# Patient Record
Sex: FEMALE | Race: WHITE
[De-identification: names, ages, dates, MRNs, and addresses within clinical notes are randomized per-mention and may not be internally consistent; named-entity substitution may affect disease eponyms.]

---

## 2021-04-16 ENCOUNTER — HOSPITAL ENCOUNTER (INPATIENT)
Dept: HOSPITAL 54 - ER | Age: 66
LOS: 2 days | Discharge: NURSING, REHAB OR LONG-TERM CARE | DRG: 699 | End: 2021-04-18
Attending: NURSE PRACTITIONER | Admitting: NURSE PRACTITIONER
Payer: MEDICARE

## 2021-04-16 VITALS — DIASTOLIC BLOOD PRESSURE: 65 MMHG | SYSTOLIC BLOOD PRESSURE: 148 MMHG

## 2021-04-16 VITALS — HEIGHT: 64 IN | BODY MASS INDEX: 31.07 KG/M2 | WEIGHT: 182 LBS

## 2021-04-16 DIAGNOSIS — E78.5: ICD-10-CM

## 2021-04-16 DIAGNOSIS — I16.0: ICD-10-CM

## 2021-04-16 DIAGNOSIS — I70.1: Primary | ICD-10-CM

## 2021-04-16 DIAGNOSIS — R62.50: ICD-10-CM

## 2021-04-16 DIAGNOSIS — G40.909: ICD-10-CM

## 2021-04-16 DIAGNOSIS — F03.90: ICD-10-CM

## 2021-04-16 DIAGNOSIS — Z20.822: ICD-10-CM

## 2021-04-16 DIAGNOSIS — F41.9: ICD-10-CM

## 2021-04-16 DIAGNOSIS — I70.0: ICD-10-CM

## 2021-04-16 DIAGNOSIS — Z79.899: ICD-10-CM

## 2021-04-16 DIAGNOSIS — R91.8: ICD-10-CM

## 2021-04-16 DIAGNOSIS — R18.8: ICD-10-CM

## 2021-04-16 DIAGNOSIS — F20.9: ICD-10-CM

## 2021-04-16 DIAGNOSIS — I10: ICD-10-CM

## 2021-04-16 DIAGNOSIS — E87.1: ICD-10-CM

## 2021-04-16 LAB
ALBUMIN SERPL BCP-MCNC: 3.6 G/DL (ref 3.4–5)
ALP SERPL-CCNC: 83 U/L (ref 46–116)
ALT SERPL W P-5'-P-CCNC: 17 U/L (ref 12–78)
AST SERPL W P-5'-P-CCNC: 18 U/L (ref 15–37)
BASOPHILS # BLD AUTO: 0.1 /CMM (ref 0–0.2)
BASOPHILS NFR BLD AUTO: 0.6 % (ref 0–2)
BILIRUB DIRECT SERPL-MCNC: 0.1 MG/DL (ref 0–0.2)
BILIRUB SERPL-MCNC: 0.2 MG/DL (ref 0.2–1)
BUN SERPL-MCNC: 11 MG/DL (ref 7–18)
CALCIUM SERPL-MCNC: 8.4 MG/DL (ref 8.5–10.1)
CHLORIDE SERPL-SCNC: 92 MMOL/L (ref 98–107)
CO2 SERPL-SCNC: 28 MMOL/L (ref 21–32)
CREAT SERPL-MCNC: 0.9 MG/DL (ref 0.6–1.3)
EOSINOPHIL NFR BLD AUTO: 19.9 % (ref 0–6)
GLUCOSE SERPL-MCNC: 105 MG/DL (ref 74–106)
HCT VFR BLD AUTO: 39 % (ref 33–45)
HGB BLD-MCNC: 13.3 G/DL (ref 11.5–14.8)
LYMPHOCYTES NFR BLD AUTO: 2.2 /CMM (ref 0.8–4.8)
LYMPHOCYTES NFR BLD AUTO: 26.1 % (ref 20–44)
MCHC RBC AUTO-ENTMCNC: 34 G/DL (ref 31–36)
MCV RBC AUTO: 97 FL (ref 82–100)
MONOCYTES NFR BLD AUTO: 0.7 /CMM (ref 0.1–1.3)
MONOCYTES NFR BLD AUTO: 8.3 % (ref 2–12)
NEUTROPHILS # BLD AUTO: 3.9 /CMM (ref 1.8–8.9)
NEUTROPHILS NFR BLD AUTO: 45.1 % (ref 43–81)
NT-PROBNP SERPL-MCNC: 43 PG/ML (ref 0–125)
PLATELET # BLD AUTO: 207 /CMM (ref 150–450)
POTASSIUM SERPL-SCNC: 3.7 MMOL/L (ref 3.5–5.1)
PROT SERPL-MCNC: 6.9 G/DL (ref 6.4–8.2)
RBC # BLD AUTO: 4.04 MIL/UL (ref 4–5.2)
SODIUM SERPL-SCNC: 129 MMOL/L (ref 136–145)
WBC NRBC COR # BLD AUTO: 8.6 K/UL (ref 4.3–11)

## 2021-04-16 PROCEDURE — G0378 HOSPITAL OBSERVATION PER HR: HCPCS

## 2021-04-16 PROCEDURE — U0003 INFECTIOUS AGENT DETECTION BY NUCLEIC ACID (DNA OR RNA); SEVERE ACUTE RESPIRATORY SYNDROME CORONAVIRUS 2 (SARS-COV-2) (CORONAVIRUS DISEASE [COVID-19]), AMPLIFIED PROBE TECHNIQUE, MAKING USE OF HIGH THROUGHPUT TECHNOLOGIES AS DESCRIBED BY CMS-2020-01-R: HCPCS

## 2021-04-16 RX ADMIN — DIVALPROEX SODIUM SCH MG: 500 TABLET, DELAYED RELEASE ORAL at 17:22

## 2021-04-16 RX ADMIN — TRIAMCINOLONE ACETONIDE SCH GM: 1 OINTMENT TOPICAL at 17:00

## 2021-04-16 RX ADMIN — ACETAMINOPHEN PRN MG: 325 TABLET ORAL at 20:47

## 2021-04-16 RX ADMIN — SODIUM CHLORIDE PRN MLS/HR: 9 INJECTION, SOLUTION INTRAVENOUS at 17:23

## 2021-04-16 RX ADMIN — ATORVASTATIN CALCIUM SCH MG: 10 TABLET, FILM COATED ORAL at 22:39

## 2021-04-16 RX ADMIN — CALCIUM SCH MG: 500 TABLET ORAL at 17:22

## 2021-04-16 RX ADMIN — HYDRALAZINE HYDROCHLORIDE ONE MG: 20 INJECTION INTRAMUSCULAR; INTRAVENOUS at 16:05

## 2021-04-16 RX ADMIN — HYDRALAZINE HYDROCHLORIDE ONE MG: 20 INJECTION INTRAMUSCULAR; INTRAVENOUS at 14:39

## 2021-04-16 RX ADMIN — TRAZODONE HYDROCHLORIDE SCH MG: 50 TABLET ORAL at 22:40

## 2021-04-16 NOTE — NUR
RN ADMITTING NOTES

Received PATIENT FROM ER, VIA GURNEY,patient ambulatory, A/Ox3, forgetful, on room air, SPO2 
96%, no SOB noted, no pain reported, IV line on L AC intact, flushed, patent, starred NS @ 
75cc/hr,  safety measures in place, call light in reach, will cont to monitor

## 2021-04-16 NOTE — NUR
HOLD HYDRALAZINE PER DR. GOODSON DUE TO BP STABLE. PT DENIES CP, SOB, DIZZINESS, 
N/V, HA AT THIS TIME. WILL CONT TO MONITOR.

## 2021-04-16 NOTE — NUR
RN NOTES



PT USES RESTROOM, HAS STEADY GAIT. STANDBY ASSIST. EDUCATED PT ON IMPORTANCE OF USING CALL 
LIGHT FOR ASSISTANCE.

## 2021-04-16 NOTE — NUR
carla, from [facility] and care, c/o headache and high blood pressure. bp 201/108

PT AAOX4, VSS. RR EVEN & UNLABORED. DENIES CP, SOB, DIZZINESS, N/V AT THIS 
TIME. PT SEEN & EVAL'D BY DR. GOODSON. PLACED ON CARDIAC MONITOR, SR. WILL CONT 
TO MONITOR.

## 2021-04-16 NOTE — NUR
RN OPENING NOTES



REC/D PT IN BED, AWAKE. A/O X3. PT NEEDS CONSISTENT REORIENTATION, FORGETFUL. PT IS ON ROOM 
AIR, TOLERATING WELL. NO S/S OF SOB OR RESP DISTRESS NOTED AT THIS TIME. PT IS NORMAL SINUS 
ON TELE MONITOR ING WITH HEART RATE OF 96. PT HAS IV ON LEFT AC, FLUSHED WITH IVF NS RUNNING 
AT 75ML/HR AS ORDERED. AMBULATORY WITH ASSIST, PT AT THIS TIME DENIES PAIN. SAFETY MEASURES 
IN PLACE HOB ELEVATED AS TOLERATED. SIDE RAILS UP X2 BED LOCKED IN LOWEST POSITION WITH CALL 
LIGHT WITHIN REACH BED ALARM ON. WILL CONT TO MONITOR CLOSELY MAKING FREQUENT ROUNDS.

## 2021-04-17 VITALS — DIASTOLIC BLOOD PRESSURE: 82 MMHG | SYSTOLIC BLOOD PRESSURE: 159 MMHG

## 2021-04-17 VITALS — SYSTOLIC BLOOD PRESSURE: 155 MMHG | DIASTOLIC BLOOD PRESSURE: 81 MMHG

## 2021-04-17 VITALS — SYSTOLIC BLOOD PRESSURE: 158 MMHG | DIASTOLIC BLOOD PRESSURE: 102 MMHG

## 2021-04-17 VITALS — SYSTOLIC BLOOD PRESSURE: 129 MMHG | DIASTOLIC BLOOD PRESSURE: 85 MMHG

## 2021-04-17 VITALS — DIASTOLIC BLOOD PRESSURE: 61 MMHG | SYSTOLIC BLOOD PRESSURE: 112 MMHG

## 2021-04-17 VITALS — DIASTOLIC BLOOD PRESSURE: 60 MMHG | SYSTOLIC BLOOD PRESSURE: 102 MMHG

## 2021-04-17 VITALS — SYSTOLIC BLOOD PRESSURE: 179 MMHG | DIASTOLIC BLOOD PRESSURE: 114 MMHG

## 2021-04-17 VITALS — DIASTOLIC BLOOD PRESSURE: 68 MMHG | SYSTOLIC BLOOD PRESSURE: 141 MMHG

## 2021-04-17 LAB
BASOPHILS # BLD AUTO: 0.1 /CMM (ref 0–0.2)
BASOPHILS NFR BLD AUTO: 0.7 % (ref 0–2)
BUN SERPL-MCNC: 13 MG/DL (ref 7–18)
CALCIUM SERPL-MCNC: 9 MG/DL (ref 8.5–10.1)
CHLORIDE SERPL-SCNC: 99 MMOL/L (ref 98–107)
CHOLEST SERPL-MCNC: 165 MG/DL (ref ?–200)
CO2 SERPL-SCNC: 25 MMOL/L (ref 21–32)
CREAT SERPL-MCNC: 0.6 MG/DL (ref 0.6–1.3)
EOSINOPHIL NFR BLD AUTO: 16.9 % (ref 0–6)
GLUCOSE SERPL-MCNC: 97 MG/DL (ref 74–106)
HCT VFR BLD AUTO: 40 % (ref 33–45)
HDLC SERPL-MCNC: 85 MG/DL (ref 40–60)
HGB BLD-MCNC: 13.3 G/DL (ref 11.5–14.8)
LDLC SERPL DIRECT ASSAY-MCNC: 69 MG/DL (ref 0–99)
LYMPHOCYTES NFR BLD AUTO: 1.9 /CMM (ref 0.8–4.8)
LYMPHOCYTES NFR BLD AUTO: 21.3 % (ref 20–44)
MAGNESIUM SERPL-MCNC: 2.3 MG/DL (ref 1.8–2.4)
MCHC RBC AUTO-ENTMCNC: 34 G/DL (ref 31–36)
MCV RBC AUTO: 97 FL (ref 82–100)
MONOCYTES NFR BLD AUTO: 0.8 /CMM (ref 0.1–1.3)
MONOCYTES NFR BLD AUTO: 9.1 % (ref 2–12)
NEUTROPHILS # BLD AUTO: 4.6 /CMM (ref 1.8–8.9)
NEUTROPHILS NFR BLD AUTO: 52 % (ref 43–81)
PHOSPHATE SERPL-MCNC: 3.7 MG/DL (ref 2.5–4.9)
PLATELET # BLD AUTO: 215 /CMM (ref 150–450)
POTASSIUM SERPL-SCNC: 4 MMOL/L (ref 3.5–5.1)
RBC # BLD AUTO: 4.09 MIL/UL (ref 4–5.2)
SODIUM SERPL-SCNC: 133 MMOL/L (ref 136–145)
TRIGL SERPL-MCNC: 38 MG/DL (ref 30–150)
TSH SERPL DL<=0.005 MIU/L-ACNC: 2.74 UIU/ML (ref 0.36–3.74)
WBC NRBC COR # BLD AUTO: 8.9 K/UL (ref 4.3–11)

## 2021-04-17 RX ADMIN — LISINOPRIL SCH MG: 20 TABLET ORAL at 09:12

## 2021-04-17 RX ADMIN — TRIAMCINOLONE ACETONIDE SCH GM: 1 OINTMENT TOPICAL at 17:00

## 2021-04-17 RX ADMIN — TRIAMCINOLONE ACETONIDE SCH GM: 1 OINTMENT TOPICAL at 09:31

## 2021-04-17 RX ADMIN — NITROGLYCERIN SCH GM: 20 OINTMENT TOPICAL at 09:32

## 2021-04-17 RX ADMIN — HYDRALAZINE HYDROCHLORIDE PRN MG: 20 INJECTION INTRAMUSCULAR; INTRAVENOUS at 14:21

## 2021-04-17 RX ADMIN — SODIUM CHLORIDE PRN MLS/HR: 9 INJECTION, SOLUTION INTRAVENOUS at 06:36

## 2021-04-17 RX ADMIN — AMLODIPINE BESYLATE SCH MG: 5 TABLET ORAL at 09:11

## 2021-04-17 RX ADMIN — DIVALPROEX SODIUM SCH MG: 500 TABLET, DELAYED RELEASE ORAL at 17:34

## 2021-04-17 RX ADMIN — TRAZODONE HYDROCHLORIDE SCH MG: 50 TABLET ORAL at 21:07

## 2021-04-17 RX ADMIN — HYDRALAZINE HYDROCHLORIDE PRN MG: 20 INJECTION INTRAMUSCULAR; INTRAVENOUS at 08:05

## 2021-04-17 RX ADMIN — CALCIUM SCH MG: 500 TABLET ORAL at 09:10

## 2021-04-17 RX ADMIN — Medication SCH MCG: at 09:12

## 2021-04-17 RX ADMIN — CALCIUM SCH MG: 500 TABLET ORAL at 17:33

## 2021-04-17 RX ADMIN — NITROGLYCERIN SCH GM: 20 OINTMENT TOPICAL at 21:07

## 2021-04-17 RX ADMIN — PAROXETINE HYDROCHLORIDE SCH MG: 10 TABLET, FILM COATED ORAL at 09:10

## 2021-04-17 RX ADMIN — DIVALPROEX SODIUM SCH MG: 500 TABLET, DELAYED RELEASE ORAL at 09:10

## 2021-04-17 RX ADMIN — PANTOPRAZOLE SODIUM SCH MG: 40 TABLET, DELAYED RELEASE ORAL at 09:09

## 2021-04-17 RX ADMIN — ACETAMINOPHEN PRN MG: 325 TABLET ORAL at 09:42

## 2021-04-17 RX ADMIN — VITAMIN D, TAB 1000IU (100/BT) SCH UNIT: 25 TAB at 09:10

## 2021-04-17 RX ADMIN — ATORVASTATIN CALCIUM SCH MG: 10 TABLET, FILM COATED ORAL at 21:07

## 2021-04-17 NOTE — NUR
RN Note:



Pt received alert awake oriented X 2 with periods of confusion & forgetfulness. On RA, no 
breathing distress noted. Denies pain & discomfort. Safety measures observed, IV line 
intact, running with IV fluids as ordered. Encourage to use call light for assistance. 
Continue to monitor.

## 2021-04-17 NOTE — NUR
RN CLOSING NOTES



NO SIGNIFICANT CHANGES OVER NIGHT. PT STILL ON ROOM AIR, TOLERATING WELL, NO S/S OF  SOB OR 
RESP DISTRESS AT THIS TIME. PT DENIES PAIN. ON TELE IS STILL SINUS RHYTHM WITH HEART RATE IN 
THE 80S. PT REMAINS WITH RIGHT HAND #22 WITH NS @ 75CC/HR RUNNING AS ORDERED. NO S/S OF 
INFILTRATION NOTED. BED BATH DONE, ALL NEEDS ATTENDED AND SAFETY MEASURES IN PLACE. HOB 
ELEVATED. SIDE RAILS UP X2, BED LOCKED IN LOWEST POSITION WITH BED ALARM ON. CALL LIGHT 
WITHIN REACH. WILL ENDORSE TO AM SHIFT FOR CONTINUATION OF CARE.

## 2021-04-17 NOTE — NUR
RN NOTES



PT APPEARS TO BE SLEEPING UPON ROUNDS. NO DISTRESS NOTED AT THIS TIME. WILL CONT TO MONITOR 
AND ASSESS FOR CHANGE OF CONDITION

## 2021-04-18 VITALS — SYSTOLIC BLOOD PRESSURE: 134 MMHG | DIASTOLIC BLOOD PRESSURE: 86 MMHG

## 2021-04-18 VITALS — SYSTOLIC BLOOD PRESSURE: 106 MMHG | DIASTOLIC BLOOD PRESSURE: 66 MMHG

## 2021-04-18 VITALS — DIASTOLIC BLOOD PRESSURE: 95 MMHG | SYSTOLIC BLOOD PRESSURE: 155 MMHG

## 2021-04-18 LAB
BASOPHILS # BLD AUTO: 0.1 /CMM (ref 0–0.2)
BASOPHILS NFR BLD AUTO: 0.5 % (ref 0–2)
BUN SERPL-MCNC: 17 MG/DL (ref 7–18)
CALCIUM SERPL-MCNC: 9 MG/DL (ref 8.5–10.1)
CHLORIDE SERPL-SCNC: 93 MMOL/L (ref 98–107)
CO2 SERPL-SCNC: 25 MMOL/L (ref 21–32)
CREAT SERPL-MCNC: 0.7 MG/DL (ref 0.6–1.3)
EOSINOPHIL NFR BLD AUTO: 11.2 % (ref 0–6)
GLUCOSE SERPL-MCNC: 96 MG/DL (ref 74–106)
HCT VFR BLD AUTO: 37 % (ref 33–45)
HGB BLD-MCNC: 12.6 G/DL (ref 11.5–14.8)
LYMPHOCYTES NFR BLD AUTO: 1.8 /CMM (ref 0.8–4.8)
LYMPHOCYTES NFR BLD AUTO: 14.8 % (ref 20–44)
MAGNESIUM SERPL-MCNC: 2.5 MG/DL (ref 1.8–2.4)
MCHC RBC AUTO-ENTMCNC: 34 G/DL (ref 31–36)
MCV RBC AUTO: 96 FL (ref 82–100)
MONOCYTES NFR BLD AUTO: 1.2 /CMM (ref 0.1–1.3)
MONOCYTES NFR BLD AUTO: 10 % (ref 2–12)
NEUTROPHILS # BLD AUTO: 7.5 /CMM (ref 1.8–8.9)
NEUTROPHILS NFR BLD AUTO: 63.5 % (ref 43–81)
PHOSPHATE SERPL-MCNC: 3.7 MG/DL (ref 2.5–4.9)
PLATELET # BLD AUTO: 218 /CMM (ref 150–450)
POTASSIUM SERPL-SCNC: 3.8 MMOL/L (ref 3.5–5.1)
RBC # BLD AUTO: 3.88 MIL/UL (ref 4–5.2)
SODIUM SERPL-SCNC: 127 MMOL/L (ref 136–145)
TSH SERPL DL<=0.005 MIU/L-ACNC: 1.8 UIU/ML (ref 0.36–3.74)
URATE SERPL-MCNC: 3.9 MG/DL (ref 2.6–7.2)
WBC NRBC COR # BLD AUTO: 11.8 K/UL (ref 4.3–11)

## 2021-04-18 RX ADMIN — ACETAMINOPHEN PRN MG: 325 TABLET ORAL at 05:34

## 2021-04-18 RX ADMIN — AMLODIPINE BESYLATE SCH MG: 5 TABLET ORAL at 08:05

## 2021-04-18 RX ADMIN — LISINOPRIL SCH MG: 20 TABLET ORAL at 08:05

## 2021-04-18 RX ADMIN — PAROXETINE HYDROCHLORIDE SCH MG: 10 TABLET, FILM COATED ORAL at 08:05

## 2021-04-18 RX ADMIN — TRIAMCINOLONE ACETONIDE SCH GM: 1 OINTMENT TOPICAL at 08:06

## 2021-04-18 RX ADMIN — DIVALPROEX SODIUM SCH MG: 500 TABLET, DELAYED RELEASE ORAL at 08:05

## 2021-04-18 RX ADMIN — PANTOPRAZOLE SODIUM SCH MG: 40 TABLET, DELAYED RELEASE ORAL at 08:04

## 2021-04-18 RX ADMIN — VITAMIN D, TAB 1000IU (100/BT) SCH UNIT: 25 TAB at 08:06

## 2021-04-18 RX ADMIN — Medication SCH MCG: at 08:04

## 2021-04-18 RX ADMIN — NITROGLYCERIN SCH GM: 20 OINTMENT TOPICAL at 08:06

## 2021-04-18 RX ADMIN — CALCIUM SCH MG: 500 TABLET ORAL at 08:05

## 2021-04-18 NOTE — NUR
PATIENT DISCHARGED IN STABLE CONDITION. PAPERWORK, INSTRUCTIONS, BELONGINGS GIVEN. IV ACCESS 
REMOVED. PATIENT DOWNGRADED TO MED SURG, SO NO TELE BOX ANYMORE.

## 2021-04-18 NOTE — NUR
RECEIVED PATIENT IN BED. NO ACUTE DISTRESS NOTED. PATIENT ALERT & ORIENTED X2, WITH 
FORGETFULNESS. PATIENT ON ROOM AIR, SATURATING WELL. PATIENT ON TELEMETRY MONITOR, NSR 
NOTED. PATIENT R HAND IV ACCESS INTACT, PATENT. PATIENT SAFETY MEASURES MAINTAINED. CALL 
LIGHT WITHIN REACH. WILL CONTINUE TO MONITOR

## 2021-04-18 NOTE — NUR
OK BY DR. GREENWOOD ORDERING MD TO CONSENT FOR CT CHEST WITH  CONTRAST, KRISTOPHER DAVIDSON CONSENTED 
RADIOLOGIST MADE AWARE.

## 2021-04-19 NOTE — NUR
note: 

SW contacted patient's Board and Care [phone] to locate a family member or legal 
decision maker. ILANA spoke to the owner of the Board and Care, Avery who stated that the 
patient does not have any family contact. Avery stated the patient is a [facility] 
client and provided this SW with the patient's 's contact information (Zhane Meyers [phone]).



ILANA called patient's [facility]  to discuss the patient (Zhane Meyers 
[phone]) and gather information. ILANA was unable to reach  and left a voicemail.

## 2022-02-03 ENCOUNTER — HOSPITAL ENCOUNTER (INPATIENT)
Dept: HOSPITAL 54 - ER | Age: 67
LOS: 2 days | Discharge: NURSING, REHAB OR LONG-TERM CARE | DRG: 69 | End: 2022-02-05
Attending: NURSE PRACTITIONER | Admitting: NURSE PRACTITIONER
Payer: MEDICARE

## 2022-02-03 VITALS — BODY MASS INDEX: 33.38 KG/M2 | WEIGHT: 188.38 LBS | HEIGHT: 63 IN

## 2022-02-03 VITALS — SYSTOLIC BLOOD PRESSURE: 186 MMHG | DIASTOLIC BLOOD PRESSURE: 99 MMHG

## 2022-02-03 DIAGNOSIS — G45.9: Primary | ICD-10-CM

## 2022-02-03 DIAGNOSIS — Z79.899: ICD-10-CM

## 2022-02-03 DIAGNOSIS — Z79.82: ICD-10-CM

## 2022-02-03 DIAGNOSIS — R13.10: ICD-10-CM

## 2022-02-03 DIAGNOSIS — F02.80: ICD-10-CM

## 2022-02-03 DIAGNOSIS — R73.9: ICD-10-CM

## 2022-02-03 DIAGNOSIS — I10: ICD-10-CM

## 2022-02-03 DIAGNOSIS — R29.701: ICD-10-CM

## 2022-02-03 DIAGNOSIS — G40.909: ICD-10-CM

## 2022-02-03 DIAGNOSIS — E78.5: ICD-10-CM

## 2022-02-03 DIAGNOSIS — F20.9: ICD-10-CM

## 2022-02-03 DIAGNOSIS — E04.1: ICD-10-CM

## 2022-02-03 DIAGNOSIS — F32.A: ICD-10-CM

## 2022-02-03 DIAGNOSIS — G20: ICD-10-CM

## 2022-02-03 DIAGNOSIS — I70.1: ICD-10-CM

## 2022-02-03 LAB
BASOPHILS # BLD AUTO: 0 K/UL (ref 0–0.2)
BASOPHILS NFR BLD AUTO: 0.8 % (ref 0–2)
BUN SERPL-MCNC: 14 MG/DL (ref 7–18)
CALCIUM SERPL-MCNC: 9.3 MG/DL (ref 8.5–10.1)
CHLORIDE SERPL-SCNC: 104 MMOL/L (ref 98–107)
CO2 SERPL-SCNC: 24 MMOL/L (ref 21–32)
CREAT SERPL-MCNC: 0.8 MG/DL (ref 0.6–1.3)
EOSINOPHIL NFR BLD AUTO: 2.9 % (ref 0–6)
GLUCOSE SERPL-MCNC: 178 MG/DL (ref 74–106)
HCT VFR BLD AUTO: 38 % (ref 33–45)
HGB BLD-MCNC: 12.8 G/DL (ref 11.5–14.8)
LYMPHOCYTES NFR BLD AUTO: 1.9 K/UL (ref 0.8–4.8)
LYMPHOCYTES NFR BLD AUTO: 30.8 % (ref 20–44)
MCHC RBC AUTO-ENTMCNC: 34 G/DL (ref 31–36)
MCV RBC AUTO: 99 FL (ref 82–100)
MONOCYTES NFR BLD AUTO: 0.5 K/UL (ref 0.1–1.3)
MONOCYTES NFR BLD AUTO: 8 % (ref 2–12)
NEUTROPHILS # BLD AUTO: 3.5 K/UL (ref 1.8–8.9)
NEUTROPHILS NFR BLD AUTO: 57.5 % (ref 43–81)
PLATELET # BLD AUTO: 249 K/UL (ref 150–450)
POTASSIUM SERPL-SCNC: 3.8 MMOL/L (ref 3.5–5.1)
RBC # BLD AUTO: 3.88 MIL/UL (ref 4–5.2)
SODIUM SERPL-SCNC: 136 MMOL/L (ref 136–145)
WBC NRBC COR # BLD AUTO: 6.2 K/UL (ref 4.3–11)

## 2022-02-03 PROCEDURE — G0378 HOSPITAL OBSERVATION PER HR: HCPCS

## 2022-02-03 PROCEDURE — C9803 HOPD COVID-19 SPEC COLLECT: HCPCS

## 2022-02-03 RX ADMIN — CARVEDILOL SCH MG: 12.5 TABLET, FILM COATED ORAL at 17:54

## 2022-02-03 RX ADMIN — TRAZODONE HYDROCHLORIDE SCH MG: 50 TABLET ORAL at 22:48

## 2022-02-03 RX ADMIN — ATORVASTATIN CALCIUM SCH MG: 40 TABLET, FILM COATED ORAL at 22:48

## 2022-02-03 RX ADMIN — LEVETIRACETAM SCH MG: 250 TABLET, FILM COATED ORAL at 17:53

## 2022-02-03 RX ADMIN — ENOXAPARIN SODIUM SCH MG: 40 INJECTION SUBCUTANEOUS at 17:46

## 2022-02-04 VITALS — SYSTOLIC BLOOD PRESSURE: 131 MMHG | DIASTOLIC BLOOD PRESSURE: 79 MMHG

## 2022-02-04 VITALS — DIASTOLIC BLOOD PRESSURE: 76 MMHG | SYSTOLIC BLOOD PRESSURE: 128 MMHG

## 2022-02-04 VITALS — SYSTOLIC BLOOD PRESSURE: 148 MMHG | DIASTOLIC BLOOD PRESSURE: 76 MMHG

## 2022-02-04 VITALS — SYSTOLIC BLOOD PRESSURE: 155 MMHG | DIASTOLIC BLOOD PRESSURE: 104 MMHG

## 2022-02-04 VITALS — DIASTOLIC BLOOD PRESSURE: 75 MMHG | SYSTOLIC BLOOD PRESSURE: 150 MMHG

## 2022-02-04 LAB
BASOPHILS # BLD AUTO: 0 K/UL (ref 0–0.2)
BASOPHILS NFR BLD AUTO: 0.6 % (ref 0–2)
BUN SERPL-MCNC: 13 MG/DL (ref 7–18)
CALCIUM SERPL-MCNC: 9.1 MG/DL (ref 8.5–10.1)
CHLORIDE SERPL-SCNC: 103 MMOL/L (ref 98–107)
CHOLEST SERPL-MCNC: 178 MG/DL (ref ?–200)
CO2 SERPL-SCNC: 25 MMOL/L (ref 21–32)
CREAT SERPL-MCNC: 0.7 MG/DL (ref 0.6–1.3)
EOSINOPHIL NFR BLD AUTO: 2.1 % (ref 0–6)
GLUCOSE SERPL-MCNC: 106 MG/DL (ref 74–106)
HCT VFR BLD AUTO: 39 % (ref 33–45)
HDLC SERPL-MCNC: 88 MG/DL (ref 40–60)
HGB BLD-MCNC: 12.9 G/DL (ref 11.5–14.8)
LDLC SERPL DIRECT ASSAY-MCNC: 68 MG/DL (ref 0–99)
LYMPHOCYTES NFR BLD AUTO: 2.1 K/UL (ref 0.8–4.8)
LYMPHOCYTES NFR BLD AUTO: 29.2 % (ref 20–44)
MAGNESIUM SERPL-MCNC: 2.2 MG/DL (ref 1.8–2.4)
MCHC RBC AUTO-ENTMCNC: 33 G/DL (ref 31–36)
MCV RBC AUTO: 99 FL (ref 82–100)
MONOCYTES NFR BLD AUTO: 0.8 K/UL (ref 0.1–1.3)
MONOCYTES NFR BLD AUTO: 11.1 % (ref 2–12)
NEUTROPHILS # BLD AUTO: 4.2 K/UL (ref 1.8–8.9)
NEUTROPHILS NFR BLD AUTO: 57 % (ref 43–81)
PLATELET # BLD AUTO: 245 K/UL (ref 150–450)
POTASSIUM SERPL-SCNC: 3.6 MMOL/L (ref 3.5–5.1)
RBC # BLD AUTO: 3.91 MIL/UL (ref 4–5.2)
SODIUM SERPL-SCNC: 136 MMOL/L (ref 136–145)
TRIGL SERPL-MCNC: 91 MG/DL (ref 30–150)
TSH SERPL DL<=0.005 MIU/L-ACNC: 2.73 UIU/ML (ref 0.36–3.74)
WBC NRBC COR # BLD AUTO: 7.3 K/UL (ref 4.3–11)

## 2022-02-04 RX ADMIN — LEVETIRACETAM SCH MG: 250 TABLET, FILM COATED ORAL at 17:37

## 2022-02-04 RX ADMIN — CARVEDILOL SCH MG: 12.5 TABLET, FILM COATED ORAL at 08:17

## 2022-02-04 RX ADMIN — Medication SCH MCG: at 08:15

## 2022-02-04 RX ADMIN — VITAMIN D, TAB 1000IU (100/BT) SCH UNIT: 25 TAB at 08:16

## 2022-02-04 RX ADMIN — ENOXAPARIN SODIUM SCH MG: 40 INJECTION SUBCUTANEOUS at 17:40

## 2022-02-04 RX ADMIN — LEVETIRACETAM SCH MG: 250 TABLET, FILM COATED ORAL at 08:16

## 2022-02-04 RX ADMIN — AMLODIPINE BESYLATE SCH MG: 5 TABLET ORAL at 08:16

## 2022-02-04 RX ADMIN — TRAZODONE HYDROCHLORIDE SCH MG: 50 TABLET ORAL at 21:02

## 2022-02-04 RX ADMIN — CARVEDILOL SCH MG: 12.5 TABLET, FILM COATED ORAL at 17:37

## 2022-02-04 RX ADMIN — ATORVASTATIN CALCIUM SCH MG: 40 TABLET, FILM COATED ORAL at 21:02

## 2022-02-04 RX ADMIN — PAROXETINE HYDROCHLORIDE SCH MG: 10 TABLET, FILM COATED ORAL at 09:22

## 2022-02-05 VITALS — DIASTOLIC BLOOD PRESSURE: 78 MMHG | SYSTOLIC BLOOD PRESSURE: 119 MMHG

## 2022-02-05 VITALS — DIASTOLIC BLOOD PRESSURE: 86 MMHG | SYSTOLIC BLOOD PRESSURE: 170 MMHG

## 2022-02-05 VITALS — DIASTOLIC BLOOD PRESSURE: 87 MMHG | SYSTOLIC BLOOD PRESSURE: 154 MMHG

## 2022-02-05 VITALS — SYSTOLIC BLOOD PRESSURE: 146 MMHG | DIASTOLIC BLOOD PRESSURE: 88 MMHG

## 2022-02-05 VITALS — DIASTOLIC BLOOD PRESSURE: 86 MMHG | SYSTOLIC BLOOD PRESSURE: 148 MMHG

## 2022-02-05 LAB
BASOPHILS # BLD AUTO: 0 K/UL (ref 0–0.2)
BASOPHILS NFR BLD AUTO: 0.6 % (ref 0–2)
BUN SERPL-MCNC: 12 MG/DL (ref 7–18)
CALCIUM SERPL-MCNC: 9.6 MG/DL (ref 8.5–10.1)
CHLORIDE SERPL-SCNC: 101 MMOL/L (ref 98–107)
CO2 SERPL-SCNC: 27 MMOL/L (ref 21–32)
CREAT SERPL-MCNC: 0.7 MG/DL (ref 0.6–1.3)
EOSINOPHIL NFR BLD AUTO: 2.4 % (ref 0–6)
GLUCOSE SERPL-MCNC: 107 MG/DL (ref 74–106)
HCT VFR BLD AUTO: 38 % (ref 33–45)
HGB BLD-MCNC: 12.9 G/DL (ref 11.5–14.8)
LYMPHOCYTES NFR BLD AUTO: 1.8 K/UL (ref 0.8–4.8)
LYMPHOCYTES NFR BLD AUTO: 32 % (ref 20–44)
MAGNESIUM SERPL-MCNC: 2.2 MG/DL (ref 1.8–2.4)
MCHC RBC AUTO-ENTMCNC: 34 G/DL (ref 31–36)
MCV RBC AUTO: 98 FL (ref 82–100)
MONOCYTES NFR BLD AUTO: 0.7 K/UL (ref 0.1–1.3)
MONOCYTES NFR BLD AUTO: 12.3 % (ref 2–12)
NEUTROPHILS # BLD AUTO: 3 K/UL (ref 1.8–8.9)
NEUTROPHILS NFR BLD AUTO: 52.7 % (ref 43–81)
PLATELET # BLD AUTO: 230 K/UL (ref 150–450)
POTASSIUM SERPL-SCNC: 3.7 MMOL/L (ref 3.5–5.1)
RBC # BLD AUTO: 3.86 MIL/UL (ref 4–5.2)
SODIUM SERPL-SCNC: 136 MMOL/L (ref 136–145)
WBC NRBC COR # BLD AUTO: 5.7 K/UL (ref 4.3–11)

## 2022-02-05 RX ADMIN — AMLODIPINE BESYLATE SCH MG: 5 TABLET ORAL at 08:11

## 2022-02-05 RX ADMIN — CARVEDILOL SCH MG: 12.5 TABLET, FILM COATED ORAL at 08:10

## 2022-02-05 RX ADMIN — VITAMIN D, TAB 1000IU (100/BT) SCH UNIT: 25 TAB at 08:10

## 2022-02-05 RX ADMIN — Medication SCH MCG: at 08:10

## 2022-02-05 RX ADMIN — LEVETIRACETAM SCH MG: 250 TABLET, FILM COATED ORAL at 16:24

## 2022-02-05 RX ADMIN — LEVETIRACETAM SCH MG: 250 TABLET, FILM COATED ORAL at 08:10

## 2022-02-05 RX ADMIN — PAROXETINE HYDROCHLORIDE SCH MG: 10 TABLET, FILM COATED ORAL at 08:13

## 2022-02-05 RX ADMIN — CARVEDILOL SCH MG: 12.5 TABLET, FILM COATED ORAL at 16:24

## 2022-05-02 ENCOUNTER — OFFICE (OUTPATIENT)
Dept: URBAN - METROPOLITAN AREA CLINIC 67 | Facility: CLINIC | Age: 67
End: 2022-05-02

## 2022-05-02 VITALS — DIASTOLIC BLOOD PRESSURE: 85 MMHG | WEIGHT: 180 LBS | SYSTOLIC BLOOD PRESSURE: 158 MMHG

## 2022-05-02 DIAGNOSIS — R13.10 DYSPHAGIA: ICD-10-CM

## 2022-05-02 DIAGNOSIS — R93.3 ABNORMAL GI STUDY/IMAGING: ICD-10-CM

## 2022-05-02 DIAGNOSIS — K22.2 ESOPHAGEAL STRICTURE: ICD-10-CM

## 2022-05-02 PROCEDURE — 99204 OFFICE O/P NEW MOD 45 MIN: CPT | Performed by: STUDENT IN AN ORGANIZED HEALTH CARE EDUCATION/TRAINING PROGRAM

## 2022-05-02 NOTE — SERVICENOTES
This note was dictated with Dragon dictation software and any errors or typos are unintentional, PSJ with MAC

## 2022-05-02 NOTE — SERVICEHPINOTES
I had the pleasure of seeing the patient in consultation for dysphagia. The patient is a pleasant 67-year-old female accompanied by her caretaker who resides in a long-term care facility with a past medical history seizures, hypertension, hyperlipidemia, and bipolar disorder. Over the past several months they have been noticing that the patient has been having difficulty after swallowing food with episodes of choking and regurgitation. She is having to be on a puréed diet now. They report an ENT evaluation with inflammation and mucus in the upper airway. An esophagram was performed that demonstrated an esophageal ring, tertiary contractions, and a small hiatal hernia. The patient herself notes that food sometimes gets stuck in her neck. She denies overt evidence of bleeding. She gained weight during Covid and is now losing weight which is unclear if this is intentional or not but she is on a purée diet.

## 2022-07-21 ENCOUNTER — OFFICE (OUTPATIENT)
Dept: URBAN - METROPOLITAN AREA CLINIC 67 | Facility: CLINIC | Age: 67
End: 2022-07-21

## 2022-07-21 VITALS — WEIGHT: 162 LBS | DIASTOLIC BLOOD PRESSURE: 87 MMHG | SYSTOLIC BLOOD PRESSURE: 143 MMHG

## 2022-07-21 DIAGNOSIS — R13.19 ODYNOPHAGIA: ICD-10-CM

## 2022-07-21 DIAGNOSIS — K22.2 ESOPHAGEAL STRICTURE: ICD-10-CM

## 2022-07-21 DIAGNOSIS — R13.10 DYSPHAGIA: ICD-10-CM

## 2022-07-21 PROCEDURE — 99213 OFFICE O/P EST LOW 20 MIN: CPT

## 2022-07-21 RX ORDER — SUCRALFATE ORAL 1 G/10ML
SUSPENSION ORAL
Qty: 200 | Status: ACTIVE

## 2022-07-21 NOTE — SERVICEHPINOTES
I had the pleasure of seeing the patient and her caregiver for folllowup of dysphagia. The patient is a pleasant 67-year-old female accompanied by her caretaker who resides in a long-term care facility with a past medical history seizures, hypertension, hyperlipidemia, and bipolar disorder. She had been noticing several months of difficulty after swallowing food with episodes of choking and regurgitation.  Upper endoscopy demonstrated erosive esophagitis with an esophageal stricture which was dilated.  She is having to be on a puréed diet now. They report an ENT evaluation with inflammation and mucus in the upper airway. An esophagram was performed that demonstrated an esophageal ring, tertiary contractions, and a small hiatal hernia. The patient herself notes that food sometimes gets stuck in her neck. She denies overt evidence of bleeding. She gained weight during Covid and is now losing weight which is unclear if this is intentional or not but she is on a purée diet.

## 2023-01-25 ENCOUNTER — OFFICE (OUTPATIENT)
Dept: URBAN - METROPOLITAN AREA CLINIC 67 | Facility: CLINIC | Age: 68
End: 2023-01-25

## 2023-01-25 VITALS — WEIGHT: 158 LBS | SYSTOLIC BLOOD PRESSURE: 125 MMHG | DIASTOLIC BLOOD PRESSURE: 84 MMHG | HEIGHT: 60 IN

## 2023-01-25 DIAGNOSIS — Z86.010 PERSONAL HISTORY COLON POLYPS: ICD-10-CM

## 2023-01-25 DIAGNOSIS — R56.9 SEIZURE: ICD-10-CM

## 2023-01-25 DIAGNOSIS — K22.2 ESOPHAGEAL STRICTURE: ICD-10-CM

## 2023-01-25 DIAGNOSIS — R13.10 DYSPHAGIA: ICD-10-CM

## 2023-01-25 PROCEDURE — 99214 OFFICE O/P EST MOD 30 MIN: CPT | Performed by: STUDENT IN AN ORGANIZED HEALTH CARE EDUCATION/TRAINING PROGRAM

## 2023-01-25 NOTE — SERVICEHPINOTES
I had the pleasure of seeing the patient and her caregiver for folllowup of dysphagia and a history of colon polyps. The patient is a pleasant 67-year-old female accompanied by her caretaker who resides in a long-term care facility with a past medical history seizures, hypertension, hyperlipidemia, and bipolar disorder. She has a history of GERD with a recurrent lower esophageal stricture requiring dilation.  Last endoscopy was in the middle of last year and while stricture was visible it was very mild and she was maximally dilated at that time.  She did notice an improvement but now returns with some episodes of swallowing difficulty.  She is not having any choking episodes anymore but she subjectively complains of some difficulty with swallowing.  The caretaker did mention that she does have times where she will complain of things for attention seeking.  The patient also had a colonoscopy in 2018 and is due for repeat.

## 2023-04-16 ENCOUNTER — HOSPITAL ENCOUNTER (INPATIENT)
Dept: HOSPITAL 54 - ER | Age: 68
LOS: 4 days | Discharge: HOME HEALTH | DRG: 177 | End: 2023-04-20
Attending: INTERNAL MEDICINE | Admitting: NURSE PRACTITIONER
Payer: MEDICARE

## 2023-04-16 VITALS — SYSTOLIC BLOOD PRESSURE: 157 MMHG | DIASTOLIC BLOOD PRESSURE: 88 MMHG

## 2023-04-16 VITALS — WEIGHT: 143 LBS | HEIGHT: 64 IN | BODY MASS INDEX: 24.41 KG/M2

## 2023-04-16 DIAGNOSIS — F09: ICD-10-CM
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
## 2023-04-16 DIAGNOSIS — Z20.822: ICD-10-CM

## 2023-04-16 DIAGNOSIS — Z79.899: ICD-10-CM

## 2023-04-16 DIAGNOSIS — E66.01: ICD-10-CM

## 2023-04-16 DIAGNOSIS — J96.21: ICD-10-CM

## 2023-04-16 DIAGNOSIS — Z79.82: ICD-10-CM

## 2023-04-16 DIAGNOSIS — F03.90: ICD-10-CM

## 2023-04-16 DIAGNOSIS — D68.59: ICD-10-CM

## 2023-04-16 DIAGNOSIS — J15.6: Primary | ICD-10-CM

## 2023-04-16 DIAGNOSIS — I10: ICD-10-CM

## 2023-04-16 DIAGNOSIS — G40.909: ICD-10-CM

## 2023-04-16 LAB
BASOPHILS # BLD AUTO: 0 K/UL (ref 0–0.2)
BASOPHILS NFR BLD AUTO: 0.1 % (ref 0–2)
BUN SERPL-MCNC: 20 MG/DL (ref 7–18)
CALCIUM SERPL-MCNC: 9.2 MG/DL (ref 8.5–10.1)
CHLORIDE SERPL-SCNC: 101 MMOL/L (ref 98–107)
CO2 SERPL-SCNC: 26 MMOL/L (ref 21–32)
CREAT SERPL-MCNC: 1.1 MG/DL (ref 0.6–1.3)
EOSINOPHIL NFR BLD AUTO: 0.1 % (ref 0–6)
GLUCOSE SERPL-MCNC: 141 MG/DL (ref 74–106)
HCT VFR BLD AUTO: 34 % (ref 33–45)
HGB BLD-MCNC: 10.9 G/DL (ref 11.5–14.8)
LYMPHOCYTES NFR BLD AUTO: 0.6 K/UL (ref 0.8–4.8)
LYMPHOCYTES NFR BLD AUTO: 3.5 % (ref 20–44)
MCHC RBC AUTO-ENTMCNC: 32 G/DL (ref 31–36)
MCV RBC AUTO: 96 FL (ref 82–100)
MONOCYTES NFR BLD AUTO: 0.2 K/UL (ref 0.1–1.3)
MONOCYTES NFR BLD AUTO: 1.5 % (ref 2–12)
NEUTROPHILS # BLD AUTO: 15 K/UL (ref 1.8–8.9)
NEUTROPHILS NFR BLD AUTO: 94.8 % (ref 43–81)
PLATELET # BLD AUTO: 182 K/UL (ref 150–450)
POTASSIUM SERPL-SCNC: 3.7 MMOL/L (ref 3.5–5.1)
RBC # BLD AUTO: 3.59 MIL/UL (ref 4–5.2)
SODIUM SERPL-SCNC: 134 MMOL/L (ref 136–145)
WBC NRBC COR # BLD AUTO: 15.8 K/UL (ref 4.3–11)

## 2023-04-16 PROCEDURE — C9803 HOPD COVID-19 SPEC COLLECT: HCPCS

## 2023-04-16 PROCEDURE — A4223 INFUSION SUPPLIES W/O PUMP: HCPCS

## 2023-04-16 PROCEDURE — G0378 HOSPITAL OBSERVATION PER HR: HCPCS

## 2023-04-16 RX ADMIN — MAGNESIUM SULFATE IN DEXTROSE SCH MLS/HR: 10 INJECTION, SOLUTION INTRAVENOUS at 20:00

## 2023-04-16 RX ADMIN — ATORVASTATIN CALCIUM SCH MG: 10 TABLET, FILM COATED ORAL at 22:50

## 2023-04-16 RX ADMIN — CARVEDILOL SCH MG: 12.5 TABLET, FILM COATED ORAL at 22:50

## 2023-04-16 RX ADMIN — ENOXAPARIN SODIUM SCH MG: 40 INJECTION SUBCUTANEOUS at 22:40

## 2023-04-16 RX ADMIN — TRAZODONE HYDROCHLORIDE SCH MG: 50 TABLET ORAL at 22:46

## 2023-04-16 RX ADMIN — BENZTROPINE MESYLATE SCH MG: 1 TABLET ORAL at 22:50

## 2023-04-16 RX ADMIN — MAGNESIUM SULFATE IN DEXTROSE SCH MLS/HR: 10 INJECTION, SOLUTION INTRAVENOUS at 18:34

## 2023-04-16 RX ADMIN — LEVETIRACETAM SCH MG: 250 TABLET, FILM COATED ORAL at 22:46

## 2023-04-16 RX ADMIN — SODIUM CHLORIDE PRN MLS/HR: 9 INJECTION, SOLUTION INTRAVENOUS at 23:22

## 2023-04-16 NOTE — NUR
[id number] FROM [facility] FOR SOB COUGH CONGESTION X 2 DAYS 89% ROOM AIR 
ON SCENE, 96% ON 4L NC ON ARRIVAL

## 2023-04-16 NOTE — NUR
TELE RN ADMITTING NOTES



RECEIVED PATIENT VIA EventyardETCHER. AWAKE. A/O X 2-3. ABLE TO MAKE NEEDS KNOWN. EPISODES OF 
CONFUSION AND INCORRECT ANSWERS. PATIENT ON NASAL CANNULA @3LPM TOLERATING WELL WITH 
SATURATION OF 96%. PATIENT HAVING SOB AND WHEEZING UPON EXERTION. PATIENT DENIES ANY PAIN AT 
THE MOMENT. PATIENT ON TELE MONITOR WITH READING OF SINUS RHYTHM 70BPM. SKIN ASSESSMENT AND 
BODY CHECK DONE WITH PATIENTS PERMISSION TO DO SO. SKIN IS INTACT NOTED. IV ACCESS ON RIGHT 
AC G#20 NOTED TO BE PATENT AND INTACT INFUSING 0.9%NS @75CC/HR AS ORDERED. PATIENT WAS 
ORIENTED TO THE ROOM AND HOW TO USE THE CALL LIGHT. PATIENT VERBALIZES UNDERSTANDING. ALL 
BELONGINGS ARE ACCOUNTED FOR. SAFETY MEASURE IN PLACED; BED LOCKED AND IN LOWEST POSITION, 
HOB ELEVATED, CALL LIGHT AND BEDSIDE TABLE WITHIN PATIENT REACH.

## 2023-04-17 VITALS — SYSTOLIC BLOOD PRESSURE: 131 MMHG | DIASTOLIC BLOOD PRESSURE: 72 MMHG

## 2023-04-17 VITALS — DIASTOLIC BLOOD PRESSURE: 73 MMHG | SYSTOLIC BLOOD PRESSURE: 130 MMHG

## 2023-04-17 VITALS — DIASTOLIC BLOOD PRESSURE: 68 MMHG | SYSTOLIC BLOOD PRESSURE: 118 MMHG

## 2023-04-17 VITALS — SYSTOLIC BLOOD PRESSURE: 114 MMHG | DIASTOLIC BLOOD PRESSURE: 72 MMHG

## 2023-04-17 VITALS — DIASTOLIC BLOOD PRESSURE: 79 MMHG | SYSTOLIC BLOOD PRESSURE: 157 MMHG

## 2023-04-17 VITALS — SYSTOLIC BLOOD PRESSURE: 137 MMHG | DIASTOLIC BLOOD PRESSURE: 73 MMHG

## 2023-04-17 LAB
BASOPHILS # BLD AUTO: 0 K/UL (ref 0–0.2)
BASOPHILS NFR BLD AUTO: 0.1 % (ref 0–2)
BUN SERPL-MCNC: 15 MG/DL (ref 7–18)
CALCIUM SERPL-MCNC: 8.7 MG/DL (ref 8.5–10.1)
CHLORIDE SERPL-SCNC: 105 MMOL/L (ref 98–107)
CHOLEST SERPL-MCNC: 81 MG/DL (ref ?–200)
CO2 SERPL-SCNC: 24 MMOL/L (ref 21–32)
CREAT SERPL-MCNC: 0.7 MG/DL (ref 0.6–1.3)
EOSINOPHIL NFR BLD AUTO: 0.4 % (ref 0–6)
GLUCOSE SERPL-MCNC: 102 MG/DL (ref 74–106)
HCT VFR BLD AUTO: 30 % (ref 33–45)
HDLC SERPL-MCNC: 26 MG/DL (ref 40–60)
HGB BLD-MCNC: 10.1 G/DL (ref 11.5–14.8)
LDLC SERPL DIRECT ASSAY-MCNC: 34 MG/DL (ref 0–99)
LYMPHOCYTES NFR BLD AUTO: 0.9 K/UL (ref 0.8–4.8)
LYMPHOCYTES NFR BLD AUTO: 7.6 % (ref 20–44)
MAGNESIUM SERPL-MCNC: 2.1 MG/DL (ref 1.8–2.4)
MCHC RBC AUTO-ENTMCNC: 33 G/DL (ref 31–36)
MCV RBC AUTO: 95 FL (ref 82–100)
MONOCYTES NFR BLD AUTO: 0.4 K/UL (ref 0.1–1.3)
MONOCYTES NFR BLD AUTO: 3.2 % (ref 2–12)
NEUTROPHILS # BLD AUTO: 10.5 K/UL (ref 1.8–8.9)
NEUTROPHILS NFR BLD AUTO: 88.7 % (ref 43–81)
PHOSPHATE SERPL-MCNC: 2.4 MG/DL (ref 2.5–4.9)
PLATELET # BLD AUTO: 166 K/UL (ref 150–450)
POTASSIUM SERPL-SCNC: 3.9 MMOL/L (ref 3.5–5.1)
RBC # BLD AUTO: 3.19 MIL/UL (ref 4–5.2)
SODIUM SERPL-SCNC: 134 MMOL/L (ref 136–145)
TRIGL SERPL-MCNC: 64 MG/DL (ref 30–150)
WBC NRBC COR # BLD AUTO: 11.8 K/UL (ref 4.3–11)

## 2023-04-17 RX ADMIN — BENZTROPINE MESYLATE SCH MG: 1 TABLET ORAL at 18:01

## 2023-04-17 RX ADMIN — TRAZODONE HYDROCHLORIDE SCH MG: 50 TABLET ORAL at 21:14

## 2023-04-17 RX ADMIN — CEFEPIME HYDROCHLORIDE SCH MLS/HR: 1 INJECTION, POWDER, FOR SOLUTION INTRAMUSCULAR; INTRAVENOUS at 20:05

## 2023-04-17 RX ADMIN — LEVETIRACETAM SCH MG: 250 TABLET, FILM COATED ORAL at 21:14

## 2023-04-17 RX ADMIN — ENOXAPARIN SODIUM SCH MG: 40 INJECTION SUBCUTANEOUS at 21:15

## 2023-04-17 RX ADMIN — DEXTROSE MONOHYDRATE SCH MLS/HR: 50 INJECTION, SOLUTION INTRAVENOUS at 12:00

## 2023-04-17 RX ADMIN — BENZTROPINE MESYLATE SCH MG: 1 TABLET ORAL at 09:34

## 2023-04-17 RX ADMIN — Medication SCH MG: at 09:32

## 2023-04-17 RX ADMIN — PAROXETINE HYDROCHLORIDE SCH MG: 20 TABLET, FILM COATED ORAL at 09:37

## 2023-04-17 RX ADMIN — CARVEDILOL SCH MG: 12.5 TABLET, FILM COATED ORAL at 09:36

## 2023-04-17 RX ADMIN — ATORVASTATIN CALCIUM SCH MG: 10 TABLET, FILM COATED ORAL at 21:14

## 2023-04-17 RX ADMIN — MAGNESIUM OXIDE TAB 400 MG (241.3 MG ELEMENTAL MG) SCH MG: 400 (241.3 MG) TAB at 09:38

## 2023-04-17 RX ADMIN — Medication SCH UNIT: at 09:34

## 2023-04-17 RX ADMIN — LEVETIRACETAM SCH MG: 250 TABLET, FILM COATED ORAL at 09:34

## 2023-04-17 RX ADMIN — CARVEDILOL SCH MG: 12.5 TABLET, FILM COATED ORAL at 18:00

## 2023-04-17 RX ADMIN — CEFEPIME HYDROCHLORIDE SCH MLS/HR: 1 INJECTION, POWDER, FOR SOLUTION INTRAMUSCULAR; INTRAVENOUS at 08:08

## 2023-04-17 NOTE — NUR
RN NOTE



CEFEPIME NOT ADMINISTERED NO MEDICATION AVAILABLE. CALLED SUPERVISOR MAXIMO THERE IS NON 
AVAILABLE. CHARGE NURSE MADE AWARE. WILL CALL PHARMACY IN THE MORNING.

## 2023-04-17 NOTE — NUR
TELE RN CLOSING NOTE



PATIENT IN BED SLEEPING EASILY AWAKEN WHEN CALLED BY NAME. A/O X 2-3. ABLE TO MAKE NEEDS 
KNOWN. EPISODES OF CONFUSION AND INCORRECT ANSWERS. PATIENT ON NASAL CANNULA @3LPM 
TOLERATING WELL WITH SATURATION OF 96%. PATIENT HAVING SOB AND WHEEZING UPON EXERTION. 
PATIENT DENIES ANY PAIN AT THE MOMENT. PATIENT ON TELE MONITOR WITH READING OF SINUS RHYTHM 
70BPM. IV ACCESS ON RIGHT AC G#20 NOTED TO BE PATENT AND INTACT INFUSING 0.9%NS @75CC/HR AS 
ORDERED. ALL DUE MEDICATION IS GIVEN, ALL NEEDS ARE MET. SAFETY MEASURE IN PLACED; BED 
LOCKED AND IN LOWEST POSITION, HOB ELEVATED, CALL LIGHT AND BEDSIDE TABLE WITHIN PATIENT 
REACH. ENDORSED TO NEXT SHIFT NURSE FOR CONTINUITY OF CARE.

## 2023-04-17 NOTE — NUR
RN CLOSING NOTE- MOSTLY UNCHANGED, . A/O X 2-3. ABLE TO MAKE NEEDS KNOWN. PATIENT ON NASAL 
CANNULA @3LPM TOLERATING WELL WITH SATURATION OF 98% 02 WITH PERIODS OF NON USE. PT REMOVES. 
SATS AT 92-94%. PATIENT. PATIENT ON TELE MONITOR WITH READING OF SINUS RHYTHM 70 BPM. IV 
ACCESS ON RIGHT AC G#20 NOTED TO BE PATENT AND INTACT INFUSING 0.9%NS @75CC/HR AS ORDERED. 
CXR IN AM, AM LABS,  SAFETY MEASURE IN PLACED; BED LOCKED AND IN LOWEST POSITION, HOB 
ELEVATED, CALL LIGHT AND BEDSIDE TABLE WITHIN PATIENT REACH. MONITOR/ ASSIST

## 2023-04-17 NOTE — NUR
RN OPENING NOTE- PATIENT ALERT, . A/O X 2-3. ABLE TO MAKE NEEDS KNOWN. PATIENT ON NASAL 
CANNULA @3LPM TOLERATING WELL WITH SATURATION OF 98% PATIENT DENIES ANY PAIN AT THE MOMENT. 
PATIENT ON TELE MONITOR WITH READING OF SINUS RHYTHM 74 BPM. IV ACCESS ON RIGHT AC G#20 
NOTED TO BE PATENT AND INTACT INFUSING 0.9%NS @75CC/HR AS ORDERED.  SAFETY MEASURE IN 
PLACED; BED LOCKED AND IN LOWEST POSITION, HOB ELEVATED, CALL LIGHT AND BEDSIDE TABLE WITHIN 
PATIENT REACH. MONITOR/ ASSIST

## 2023-04-17 NOTE — NUR
TELE RN OPENING NOTE



RECEIVED PT AWAKE IN BED, HOB ELEVATED, WEARING OWN CLOTHES. A/O X1-2, ORIENTED TO NAME AND 
TIME ONLY, A BIT CONFUSED, WILL SAY RANDOM WORDS, ABLE TO MAKE NEEDS KNOWN. ON O2 3L VIA NC, 
WITH NO S/S OF SOB OR DISTRESS. DENIES PAIN AT THIS TIME. ON TELE MONITOR WITH READING OF 
SINUS RHYTHM 75 BPM. IV ACCESS RAC #20G NOTED TO BE PATENT AND INTACT INFUSING 0.9%NS 
@75CC/HR AS ORDERED. SAFETY MEASURES IN PLACE: BED LOCKED AND IN LOWEST POSITION, SIDE RAILS 
UP X3, CALL LIGHT AND BEDSIDE TABLE WITHIN PATIENT REACH. WILL CONTINUE TO MONITOR AND 
ASSIST.

## 2023-04-18 VITALS — SYSTOLIC BLOOD PRESSURE: 145 MMHG | DIASTOLIC BLOOD PRESSURE: 79 MMHG

## 2023-04-18 VITALS — DIASTOLIC BLOOD PRESSURE: 85 MMHG | SYSTOLIC BLOOD PRESSURE: 133 MMHG

## 2023-04-18 VITALS — SYSTOLIC BLOOD PRESSURE: 133 MMHG | DIASTOLIC BLOOD PRESSURE: 63 MMHG

## 2023-04-18 VITALS — DIASTOLIC BLOOD PRESSURE: 76 MMHG | SYSTOLIC BLOOD PRESSURE: 126 MMHG

## 2023-04-18 VITALS — DIASTOLIC BLOOD PRESSURE: 97 MMHG | SYSTOLIC BLOOD PRESSURE: 156 MMHG

## 2023-04-18 VITALS — DIASTOLIC BLOOD PRESSURE: 78 MMHG | SYSTOLIC BLOOD PRESSURE: 113 MMHG

## 2023-04-18 VITALS — DIASTOLIC BLOOD PRESSURE: 63 MMHG | SYSTOLIC BLOOD PRESSURE: 133 MMHG

## 2023-04-18 LAB
BUN SERPL-MCNC: 11 MG/DL (ref 7–18)
CALCIUM SERPL-MCNC: 8.9 MG/DL (ref 8.5–10.1)
CHLORIDE SERPL-SCNC: 103 MMOL/L (ref 98–107)
CO2 SERPL-SCNC: 25 MMOL/L (ref 21–32)
CREAT SERPL-MCNC: 0.6 MG/DL (ref 0.6–1.3)
GLUCOSE SERPL-MCNC: 122 MG/DL (ref 74–106)
PHOSPHATE SERPL-MCNC: 2.8 MG/DL (ref 2.5–4.9)
POTASSIUM SERPL-SCNC: 3.4 MMOL/L (ref 3.5–5.1)
SODIUM SERPL-SCNC: 135 MMOL/L (ref 136–145)

## 2023-04-18 RX ADMIN — CEFEPIME HYDROCHLORIDE SCH MLS/HR: 1 INJECTION, POWDER, FOR SOLUTION INTRAMUSCULAR; INTRAVENOUS at 08:43

## 2023-04-18 RX ADMIN — ATORVASTATIN CALCIUM SCH MG: 10 TABLET, FILM COATED ORAL at 21:19

## 2023-04-18 RX ADMIN — LEVETIRACETAM SCH MG: 250 TABLET, FILM COATED ORAL at 08:32

## 2023-04-18 RX ADMIN — CARVEDILOL SCH MG: 12.5 TABLET, FILM COATED ORAL at 17:23

## 2023-04-18 RX ADMIN — BENZTROPINE MESYLATE SCH MG: 1 TABLET ORAL at 08:32

## 2023-04-18 RX ADMIN — MAGNESIUM OXIDE TAB 400 MG (241.3 MG ELEMENTAL MG) SCH MG: 400 (241.3 MG) TAB at 08:32

## 2023-04-18 RX ADMIN — Medication SCH MG: at 08:55

## 2023-04-18 RX ADMIN — Medication SCH UNIT: at 08:56

## 2023-04-18 RX ADMIN — PANTOPRAZOLE SODIUM SCH MG: 40 TABLET, DELAYED RELEASE ORAL at 08:32

## 2023-04-18 RX ADMIN — CARVEDILOL SCH MG: 12.5 TABLET, FILM COATED ORAL at 08:31

## 2023-04-18 RX ADMIN — ENOXAPARIN SODIUM SCH MG: 40 INJECTION SUBCUTANEOUS at 21:20

## 2023-04-18 RX ADMIN — CEFEPIME HYDROCHLORIDE SCH MLS/HR: 1 INJECTION, POWDER, FOR SOLUTION INTRAMUSCULAR; INTRAVENOUS at 20:26

## 2023-04-18 RX ADMIN — PAROXETINE HYDROCHLORIDE SCH MG: 20 TABLET, FILM COATED ORAL at 08:34

## 2023-04-18 RX ADMIN — DEXTROSE MONOHYDRATE SCH MLS/HR: 50 INJECTION, SOLUTION INTRAVENOUS at 05:19

## 2023-04-18 RX ADMIN — LEVETIRACETAM SCH MG: 250 TABLET, FILM COATED ORAL at 21:18

## 2023-04-18 RX ADMIN — SODIUM CHLORIDE PRN MLS/HR: 9 INJECTION, SOLUTION INTRAVENOUS at 20:33

## 2023-04-18 RX ADMIN — BENZTROPINE MESYLATE SCH MG: 1 TABLET ORAL at 16:30

## 2023-04-18 RX ADMIN — TRAZODONE HYDROCHLORIDE SCH MG: 50 TABLET ORAL at 21:19

## 2023-04-18 NOTE — NUR
TELE RN CLOSING NOTE



PT AWAKE IN BED, HOB ELEVATED, WEARING OWN CLOTHES. A/O X1-2, ORIENTED TO NAME AND TIME 
ONLY, A BIT CONFUSED AT TIMES, WILL SAY RANDOM SOUNDS/WORDS, ABLE TO MAKE NEEDS KNOWN. 
STABLE ON RA WITH NO S/S OF SOB OR DISTRESS, SATTING AT 97%. DENIES PAIN AT THIS TIME. ON 
TELE MONITOR WITH READING OF SINUS RHYTHM 67 BPM. IV ACCESS L HAND #22G, PATENT, INTACT, 
FLUSHING WELL, INFUSING 0.9%NS @ 75 ML/HR. ALL CARE PROVIDED AND MEDS TOLERATED WELL. SAFETY 
MEASURES MAINTAINED: BED LOCKED AND IN LOWEST POSITION, SIDE RAILS UP X3, CALL LIGHT AND 
BEDSIDE TABLE WITHIN PATIENT REACH. WILL ENDORSE IWONA TO DAY SHIFT NURSE.

## 2023-04-18 NOTE — NUR
LVN NOTE- MEDICATION REFUSAL 





PATIENT REFUSED MEDICATION @6273. HYDRALAZINE 50MG. PROVIDED BENEFIT AND EDUCATION. PATIENT 
STILL REFUSED. PATIENT SHOWING S/S OF CONFUSING, BELIEVED MEDICATION WAS PREVIOUSLY GIVEN. 
EXPLAINED AND REORIENTED THE PATIENT TO SITUATION. PATIENT STILL REFUSED.

## 2023-04-18 NOTE — NUR
RN NOTES



PATIENT COMPLAINED OF PAIN OF LOWER LEGS, PRN TYLENOL ADMINISTERED. WILL CONTINUE TO 
MONITOR.

## 2023-04-18 NOTE — NUR
SS Consult: 



SS Consult requested due to "pt. has difficulty identifying resources". The pt. is a 
68-year-old White female pt. who was admitted to [facility] due to Pneumonia. Pt. hax hx. 
of dementia, & epilepsy, per EMR. Upon SS consult, the pt. is Alert & Oriented x 3 and makes 
good eye contact. The pt. appears unkempt. Pt. has depressed mood & flat affect. Pt.s 
speech is slow and clear. Pt. was cooperative throughout interview. The pt. denies SI/HI and 
denies hallucinations. SW explored pt.s living situation. Patient states she currently at 
[facility] tel: [phone] [[id number] [facility] [address]]. SW explored 
pt.s mental health Hx. Pt. states she has  been diagnosed with Paranoid Schizophrenia in 
the past and is on medications for this but could not recall the name. SW explored pt.s 
drug & ETOH use. Pt.denies drug or ETOH use. Per pt. she is ambulatory and independent with 
her ADLs. 



Plan: Pt. stated she will return to  [facility] tel: [phone] [[id number] [facility] [address]] upon DC. Per CM note the , Kar has 
confirmed pt. may return when ready for DC. ILANA provided pt. with the following elderly 
resources and she accepted them: 



ABUSE PREVENTION: 

Elder Abuse Hotline (24/7) (112) 671-6587

Adult Protective Services Hotline (078) 860-6303

Long-Term Care [facility] [phone]  [facility]

Area On Aging (Hotline) ([phone] 149-1976



ADULT DAY HEALTH CARE CARE CENTERS: 

Private pay or Medi-cathy funded adult day care

[facility] [phone]

[facility] [phone], [facility] [phone], [facility] [phone], [facility] [phone], [facility] Care [phone], [facility] [phone], [place]

[facility] [phone], [facility] [phone], [facility] HEALTH ASSOCIATIONS:

AARP (540) 150-7185 www.aarp.org

 ALS Association (559) 152-2209(169) 303-8289 [phone] [phone] (ask for Vickie) www.als.org

 American Diabetes Association (374) 708-1894 www.diabetes.org

 American Heart Association (902) 655-7274 www.heart.org

 American Lung Association (122) 200-6343 www.lungusa.org

 American Parkinson Disease Association (787) 630-8377 www.apdaparkinson.org

 



American Red Cross (925) 039-0048, (981) 377-5170 www.redcross.org

 Arthritis Foundation (121) 971-0289 www.arthritis.org

 Crohns & Colitis Foundation of American (648) 289-5158 www.ccfa.org/chapters/[place]

 National Multiple Sclerosis Society (644) 885-0556 www.nationalmssociety.org

 Myasthenia Gravis Foundation (509) 775-3632 www.myasthenia-ca.org

 National Stroke Association (601) 378-1297 www.stroke.org



CONSERVATORSHIP & GUARDIANSHIP:

AARP (509) 645-0230

 Brittany [facility] Legal Services [phone]

 Center for Health Care Rights [phone]

 Eldercare Information and Referral [phone]

  [facility] [phone]

[facility]: 

[facility] Bar Referral Service [phone]

[facility] Legal Services [phone]

Office of the Public Guardian [place] [phone]



GRIEF AND BEREAVEMENT RESOURCES:

[place] [phone], [facility] [phone]

THE HOPE Connection [phone], [facility] [phone]

[facility] [phone], [place]



HELP AT HOME  CAREGIVER SUPPORT:

In Home Support Services (Must have Medi-Cathy to be eligible)

([phone] *Ask for a list of agencies that provide services to assist with care in the 
home.  Local Senior Centers also have listings of care providers.





HOME SAFETY MODIFICATIONS AND EQUIPMENT:

Senior centers have additional referrals.

[place] Etherpad and [employer] Dept. Handyworker Program (low income) (920) 536-8853 or 
([phone] 197-2299 Visit http://hcidla.[facility].org/low-income-sr for more information

National Seating and Mobility (272)512-1481 and/or (092)197-0895;

Forever Active (875) 758-4444 www.forevere2e Materialsmed.Context Aware Solutions

Stay Home Safe (519) 605-2345  www.Stayhomesafe.com



LIFE ALERT RESPONSE SYSTEM:

DVTelline Services 001-787-9730 www. Fetchmob

Life Alert 552-840-1980 www.Presto Engineering

Life Station [phone] www.Threadboxation.Context Aware Solutions

Safe Return 914-569-1258 www.alz.or/safereturn

Cell Phones for Seniors www.yvamx0dazxtgotyt.com



MEALS AND FOOD PROGRAMS:

[place] Meals on Wheels [phone]

[place] Meals on Wheels [phone]

[facility] [phone]

[place] to the Homebound [phone]

[place] to the Homebound [phone]

[facility] to the Homebound [phone]

[facility] to the Homebound [phone]

Alfonso Marin [phone]

[facility] [phone]

ONE Generation [phone]

[facility] [phone]

[facility] [phone]









Meals on Wheels [phone] For all ages: $6.85/ meal w side. Delivered M-F from 10 am-1pm. 
Application and payment is done over the phone. Frozen meals available for weekends. 

Emergency Food Coalition [phone]

Holiness  [phone]

[facility] [phone]

Ede Holiness Outreach- Brown bag lunches [phone]

VENITA [facility] [phone]



MEAL/GROCERY DELIVERY PROGRAMS:

[facility] Gourmet Meals [phone]- [facility]

[phone]- [facility]

Magic Kitchen [phone]

Moms Meals [phone] (ask Ceron for Discount

***Select grocery stores may provide delivery. 



MEDICAL INSURANCE SUPPORT SERVICES:

Center for Health Care Rights [phone]

Health Insurance Counseling/Advocacy Programs (HICAP)-Must have Medicare. Offers counseling 
for Medi-Cathy eligibility [phone]

[facility]  [phone] www.[facility].ca.gov



Medicare 275-882-9383 www.socialsecurity.org

Social Security [phone]



SENIOR ACTIVITY PROGRAMS:

*Contact a local senior center, adult school, recreation facility or community college for 
education, fitness, recreation, and social programs. 

Aquatic Therapy and Adapted Exercise programs through [facility] [phone]

Encore at [facility] [phone] www.[facility]/encore

H2U- Senior Friends [phone]

[place] [facility] [phone] www.oasisnet.org

Suddenly 65 [phone] www.joktmqug79.com



SENIOR CENTERS:

[facility] Center [phone]

[facility] [phone]

[facility] [phone]

[facility] [phone]

[facility] [phone]

[facility] [phone]

[facility] [phone]

[facility] [phone]

One Generation, [facility] [phone]

[facility] [phone]

[facility] [phone]

[facility] [phone]

[facility] [phone]



TRANSPORTATION:

Local [facility] Centers may have applications for transportation programs and additional 
resources. 

ACCESS Services [phone] Transportation for seniors and disabled persons 7 days a week 
requiring 254 hr. advance reservation. Must apply and register for program to be eligible. 

CITY RIDE 484-665-3325 or [phone] Transportation for seniors and persons with ADA 
card/metro disabled card in the [facility]. M-F only. Must register for services. 

ONE GENERATION  [phone] Serves 65 years + in conjunction with city ride program. Must 
be registered with both programs.

A to B Transport [phone] Provides wheelchair/gurney van service.









TRANSPORTATION CONTINUED:

Adult Medical Transport [phone] Accepts [facility] with prior authorization. 

Care Van [phone] Provides wheelchair Transport. 

[facility] Wide Transportation [phone] Provides gurney service

[facility] [phone] Gurney Transport.

[facility] Town Transportation [phone] wheelchair & gurney transport

[facility] Transportation [phone] wheelchair & gurney transport

[place] Non-Emergency Transport [phone] wheelchair & gurney transport

[facility] Living [facility] [phone] (Short Term Transportation primarily for adults with  
 disabilities on social security income. Nominal fee may apply and a reservation is 
required.)

[employer] Cab [phone] or [phone]

[facility] [phone]

[address] [place] [address] Referral Services [phone] For additional programs & services 



VETERANS RESOURCES:

Submissions for Aid and Attendance should be done directly to Federal VA office located at: 
Federal [address] [facility] [address] [phone]



National Caregiver Support Line [phone]

Cathy [facility] Field Office [phone]

[place] Department of  Affairs [phone]

Pension Information [phone]

## 2023-04-18 NOTE — NUR
LVN TELE OPNEING NOTES 



PATIENT RECEIVED AWAKE, A/Ox4. ABLE TO MAKE NEEDS KNOWN. STABLE ON ROOM AIR. NO S/S OF SOB.  
PATIENT ON EXTERNAL MONITOR WITH THE CURRENT READING SR 67.  PATIENT CONTINENT: BRP.  IV 
ACCES L HAND G#22 , INTACT PATIENT AND FLUSHING WELL. FALL AND SAFETY PRECAUTION IN PLACE: 
BED AT THE LOWEST POSITION, LOCKED, SRx2,CALL LIGHT WITH REACH.

## 2023-04-18 NOTE — NUR
LVN TELE CLOSING NOTES  



PATIENT IN BED RESTING, A/Ox4. STABLE ON ROOM AIR. NO S/S OF SOB OR CARDIAC DISTRESS. NO 
COMPLAINTS OF PAIN AT THIS TIME. PATIENT ON EXTERNAL MONITOR WITH THE CURRENT READING SR 62. 
 PATIENT CONTINENT: BRP WITH ASSIST, MONITORING FOR S/S OF SB/ ON TELE.  IV ACCES L WRIST 
G#22 , INTACT PATIENT AND FLUSHING WELL. MEDICATION ADMINISTERED AS ORDERED. EPISODES OF 
CONFUSING OCCUR, VERBAL REORIENTATION NEEDED.  PATIENT FALL AND SAFETY PRECAUTION  
MAINTAINED: BED AT THE LOWEST POSITION, LOCKED, SRx2,CALL LIGHT WITH REACH. ENDORSED TO 
NIGHT SHIFT NURSE.

## 2023-04-18 NOTE — NUR
RN TELE OPENING NOTES



RECEIVED PT IN BED RESTING, EASILY AROUSABLE. A/O X2-3, SOME CONFUSION NOTED. ON RA WITH NO 
S/S OF SOB OR CARDIAC DISTRESS. NO COMPLAINTS OF PAIN AT THIS TIME. ON EXTERNAL MONITOR WITH 
THE CURRENT READING SR 65. IV ACCES L WRIST #22G, INTACT, PATENT, FLUSHING WELL, RUNNING NS 
@ 75 ML/HR. SAFETY PRECAUTIONS IN PLACE: BED LOCKED AND IN LOWEST POSITION, SIDE RAILS UP 
X3, CALL LIGHT AND TRAY TABLE WITHIN REACH. WILL CONTINUE TO MONITOR AND ASSIST.

## 2023-04-19 VITALS — DIASTOLIC BLOOD PRESSURE: 72 MMHG | SYSTOLIC BLOOD PRESSURE: 120 MMHG

## 2023-04-19 VITALS — SYSTOLIC BLOOD PRESSURE: 162 MMHG | DIASTOLIC BLOOD PRESSURE: 86 MMHG

## 2023-04-19 VITALS — DIASTOLIC BLOOD PRESSURE: 76 MMHG | SYSTOLIC BLOOD PRESSURE: 175 MMHG

## 2023-04-19 VITALS — SYSTOLIC BLOOD PRESSURE: 125 MMHG | DIASTOLIC BLOOD PRESSURE: 71 MMHG

## 2023-04-19 VITALS — SYSTOLIC BLOOD PRESSURE: 117 MMHG | DIASTOLIC BLOOD PRESSURE: 76 MMHG

## 2023-04-19 VITALS — SYSTOLIC BLOOD PRESSURE: 138 MMHG | DIASTOLIC BLOOD PRESSURE: 77 MMHG

## 2023-04-19 VITALS — DIASTOLIC BLOOD PRESSURE: 70 MMHG | SYSTOLIC BLOOD PRESSURE: 153 MMHG

## 2023-04-19 LAB
BASOPHILS # BLD AUTO: 0 K/UL (ref 0–0.2)
BASOPHILS NFR BLD AUTO: 0.5 % (ref 0–2)
BUN SERPL-MCNC: 13 MG/DL (ref 7–18)
CALCIUM SERPL-MCNC: 9.4 MG/DL (ref 8.5–10.1)
CHLORIDE SERPL-SCNC: 102 MMOL/L (ref 98–107)
CO2 SERPL-SCNC: 27 MMOL/L (ref 21–32)
CREAT SERPL-MCNC: 0.8 MG/DL (ref 0.6–1.3)
EOSINOPHIL NFR BLD AUTO: 2.8 % (ref 0–6)
GLUCOSE SERPL-MCNC: 99 MG/DL (ref 74–106)
HCT VFR BLD AUTO: 33 % (ref 33–45)
HGB BLD-MCNC: 11 G/DL (ref 11.5–14.8)
LYMPHOCYTES NFR BLD AUTO: 1.4 K/UL (ref 0.8–4.8)
LYMPHOCYTES NFR BLD AUTO: 20.6 % (ref 20–44)
MCHC RBC AUTO-ENTMCNC: 33 G/DL (ref 31–36)
MCV RBC AUTO: 95 FL (ref 82–100)
MONOCYTES NFR BLD AUTO: 0.8 K/UL (ref 0.1–1.3)
MONOCYTES NFR BLD AUTO: 12.9 % (ref 2–12)
NEUTROPHILS # BLD AUTO: 4.2 K/UL (ref 1.8–8.9)
NEUTROPHILS NFR BLD AUTO: 63.2 % (ref 43–81)
PLATELET # BLD AUTO: 228 K/UL (ref 150–450)
POTASSIUM SERPL-SCNC: 3.7 MMOL/L (ref 3.5–5.1)
RBC # BLD AUTO: 3.49 MIL/UL (ref 4–5.2)
SODIUM SERPL-SCNC: 138 MMOL/L (ref 136–145)
WBC NRBC COR # BLD AUTO: 6.6 K/UL (ref 4.3–11)

## 2023-04-19 RX ADMIN — PAROXETINE HYDROCHLORIDE SCH MG: 20 TABLET, FILM COATED ORAL at 09:15

## 2023-04-19 RX ADMIN — BENZTROPINE MESYLATE SCH MG: 1 TABLET ORAL at 09:18

## 2023-04-19 RX ADMIN — ENOXAPARIN SODIUM SCH MG: 40 INJECTION SUBCUTANEOUS at 21:16

## 2023-04-19 RX ADMIN — Medication SCH UNIT: at 09:15

## 2023-04-19 RX ADMIN — Medication SCH MG: at 09:14

## 2023-04-19 RX ADMIN — CEFEPIME HYDROCHLORIDE SCH MLS/HR: 1 INJECTION, POWDER, FOR SOLUTION INTRAMUSCULAR; INTRAVENOUS at 20:04

## 2023-04-19 RX ADMIN — CARVEDILOL SCH MG: 12.5 TABLET, FILM COATED ORAL at 09:19

## 2023-04-19 RX ADMIN — CEFEPIME HYDROCHLORIDE SCH MLS/HR: 1 INJECTION, POWDER, FOR SOLUTION INTRAMUSCULAR; INTRAVENOUS at 08:51

## 2023-04-19 RX ADMIN — LEVETIRACETAM SCH MG: 250 TABLET, FILM COATED ORAL at 09:14

## 2023-04-19 RX ADMIN — BENZTROPINE MESYLATE SCH MG: 1 TABLET ORAL at 17:23

## 2023-04-19 RX ADMIN — MAGNESIUM OXIDE TAB 400 MG (241.3 MG ELEMENTAL MG) SCH MG: 400 (241.3 MG) TAB at 09:14

## 2023-04-19 RX ADMIN — LEVETIRACETAM SCH MG: 250 TABLET, FILM COATED ORAL at 20:24

## 2023-04-19 RX ADMIN — TRAZODONE HYDROCHLORIDE SCH MG: 50 TABLET ORAL at 21:15

## 2023-04-19 RX ADMIN — CARVEDILOL SCH MG: 12.5 TABLET, FILM COATED ORAL at 17:24

## 2023-04-19 RX ADMIN — PANTOPRAZOLE SODIUM SCH MG: 40 TABLET, DELAYED RELEASE ORAL at 08:29

## 2023-04-19 RX ADMIN — ATORVASTATIN CALCIUM SCH MG: 10 TABLET, FILM COATED ORAL at 21:15

## 2023-04-19 NOTE — NUR
RN TELE CLOSING NOTES



PT AWAKE IN BED, ASKING TO TAKE A SHOWER. A/O X2-3, WITH SOME EPS OF CONFUSION DURING SHIFT. 
STABLE ON RA WITH NO S/S OF SOB OR CARDIAC DISTRESS. NO COMPLAINTS OF PAIN AT THIS TIME. 
PATIENT REMOVED EXTERNAL MONITOR AND REFUSING TO BE PUT BACK. WANTS TO TAKE A SHOWER FIRST. 
IV ACCESS L WRIST #22G, INTACT, PATENT, FLUSHING WELL, RUNNING NS @ 75 ML/HR. ALL CARE 
PROVIDED AND MEDS TOLERATED WELL. SAFETY PRECAUTIONS MAINTAINED: BED LOCKED AND IN LOWEST 
POSITION, SIDE RAILS UP X3, CALL LIGHT AND TRAY TABLE WITHIN REACH. WILL ENDORSE IWONA TO DAY 
SHIFT NURSE.

-------------------------------------------------------------------------------

Addendum: 04/19/23 at 0750 by JEFFERY BARNES RN

-------------------------------------------------------------------------------

LAST READING ON TELE MONITOR WAS SINUS BRONWYN, 60.

## 2023-04-19 NOTE — NUR
RN TELE CLOSING   NOTES



PATIENT IN BED  AWAKE   WITH  , . A/O X2 , SOME CONFUSION AND FORGETFULNESS  NOTED. ON RA 
WITH NO S/S OF SOB OR CARDIAC DISTRESS. NO COMPLAINTS OF PAIN AT THIS TIME. . IV ACCES RAC  
#20   G   SL  , PATENT, FLUSHING WELL, FOR   NS @ 75 ML/HR. BUT PATIENT   REFUSED  , 
AMBULATORY  WITH  BRP   SAFETY PRECAUTIONS IN PLACE: BED LOCKED AND IN LOWEST POSITION, SIDE 
RAILS UP X3, CALL LIGHT AND TRAY TABLE WITHIN REACH.  FOR  D/C PLANNING  AND  WILL ENDORSED  
TO  NEXT  SHIFT  .

## 2023-04-19 NOTE — NUR
RN TELE OPENING NOTES



RECEIVED PT IN ROOM  TAKING  SHOWER  , . A/O X2-3, SOME CONFUSION AND FORGETFULNESS  NOTED. 
ON RA WITH NO S/S OF SOB OR CARDIAC DISTRESS. NO COMPLAINTS OF PAIN AT THIS TIME. ON 
EXTERNAL MONITOR WITH THE CURRENT READING SR 65. IV ACCES L WRIST #22G, INTACT, PATENT, 
FLUSHING WELL, RUNNING NS @ 75 ML/HR. SAFETY PRECAUTIONS IN PLACE: BED LOCKED AND IN LOWEST 
POSITION, SIDE RAILS UP X3, CALL LIGHT AND TRAY TABLE WITHIN REACH. WILL CONTINUE TO MONITOR 
AND ASSIST.

## 2023-04-19 NOTE — NUR
RN NOTE



PT /76. NO PRN BP MEDS ORDERED AND PT REFUSED SCHEDULED HYDRALAZINE 50 MG TABLET @ 
1700. INFORMED NP HUMERA PALENCIA, ORDERED HYDRALAZINE IVP 10 MG ONCE. WILL ADMINISTER AS 
PRESCRIBED.

## 2023-04-19 NOTE — NUR
RN TELE OPENING NOTES



RECEIVED PATIENT IN BED ASLEEP, ON MODERATE HIGH BACK REST POSITION. ON ROOM AIR SATURATING 
WELL. A/OX2 WITH EPISODES OF FORGETFUL AND CONFUSION. AMBULATORY AND CONTINENT ABLE TO TO DO 
ADL. ON CARDIAC DIET. NO COMPLAIN OF SOB OR DISTRESS AT THIS TIME. NO PAIN OR DISCOMFORT AT 
THIS TIME. KEPT BED ON LOWER LOCKED POSITION, KEPT SIDE RAILS UP X 2 ALL THE TIME, KEPT CALL 
LIGHT WITHIN AT REACH. WILL CONTINUE TO MONITOR FOR [place].

## 2023-04-20 VITALS — SYSTOLIC BLOOD PRESSURE: 109 MMHG | DIASTOLIC BLOOD PRESSURE: 63 MMHG

## 2023-04-20 VITALS — SYSTOLIC BLOOD PRESSURE: 116 MMHG | DIASTOLIC BLOOD PRESSURE: 82 MMHG

## 2023-04-20 VITALS — DIASTOLIC BLOOD PRESSURE: 63 MMHG | SYSTOLIC BLOOD PRESSURE: 109 MMHG

## 2023-04-20 LAB
BUN SERPL-MCNC: 14 MG/DL (ref 7–18)
CALCIUM SERPL-MCNC: 9.4 MG/DL (ref 8.5–10.1)
CHLORIDE SERPL-SCNC: 103 MMOL/L (ref 98–107)
CO2 SERPL-SCNC: 26 MMOL/L (ref 21–32)
CREAT SERPL-MCNC: 0.8 MG/DL (ref 0.6–1.3)
GLUCOSE SERPL-MCNC: 102 MG/DL (ref 74–106)
POTASSIUM SERPL-SCNC: 4.1 MMOL/L (ref 3.5–5.1)
SODIUM SERPL-SCNC: 138 MMOL/L (ref 136–145)

## 2023-04-20 RX ADMIN — CARVEDILOL SCH MG: 12.5 TABLET, FILM COATED ORAL at 08:41

## 2023-04-20 RX ADMIN — BENZTROPINE MESYLATE SCH MG: 1 TABLET ORAL at 17:09

## 2023-04-20 RX ADMIN — LEVETIRACETAM SCH MG: 250 TABLET, FILM COATED ORAL at 08:38

## 2023-04-20 RX ADMIN — Medication SCH UNIT: at 08:37

## 2023-04-20 RX ADMIN — Medication SCH MG: at 08:38

## 2023-04-20 RX ADMIN — MAGNESIUM OXIDE TAB 400 MG (241.3 MG ELEMENTAL MG) SCH MG: 400 (241.3 MG) TAB at 08:43

## 2023-04-20 RX ADMIN — PANTOPRAZOLE SODIUM SCH MG: 40 TABLET, DELAYED RELEASE ORAL at 08:38

## 2023-04-20 RX ADMIN — CEFEPIME HYDROCHLORIDE SCH MLS/HR: 1 INJECTION, POWDER, FOR SOLUTION INTRAMUSCULAR; INTRAVENOUS at 08:47

## 2023-04-20 RX ADMIN — BENZTROPINE MESYLATE SCH MG: 1 TABLET ORAL at 08:40

## 2023-04-20 RX ADMIN — CARVEDILOL SCH MG: 12.5 TABLET, FILM COATED ORAL at 17:09

## 2023-04-20 RX ADMIN — PAROXETINE HYDROCHLORIDE SCH MG: 20 TABLET, FILM COATED ORAL at 08:42

## 2023-04-20 NOTE — NUR
RN NOTES



PATIENT REMOVED HER IV ACCESS, INSERTED NEW IV ACCESS AT LEFT HAND #22G SL PATENT AND 
INTACT. WILL CONTINUE TO MONITOR.

## 2023-04-20 NOTE — NUR
RN TELE OPENING NOTE



RECEIVED PATIENT IN BED ALERT AND ORIENTED X 2 WITH NO SIGNS OF RESPIRATORY DISTRESS. ABLE 
TO MAKE NEEDS KNOWN. ON ROOM AIR WITH EQUAL AND UNLABORED BREATHING WITH NO SIGNS OF 
RESPIRATORY DISTRESS. NO COMPLAIN OF PAIN OR DISCOMFORT AT THIS TIME. KEPT BED ON LOWER 
LOCKED POSITION, KEPT SIDE RAILS UP X 2 ALL THE TIME, KEPT CALL LIGHT WITHIN AT REACH. WILL 
CONTINUE WITH PLAN OF CARE.

## 2023-04-20 NOTE — NUR
MS RN NOTE



PATIENT DISCHARGED AS ORDERED. IV ACCESS REMOVED AND COVERED WITH DRY GAUZE, TOLERATED WELL. 
HEALTH TEACHING DONE REGARDING DISCHARGE INSTRUCTION TO  [place]. VERBALIZED 
UNDERSTANDING AND APPRECIATION. IN STABLE CONDITION. PATIENT AND CAREGIVER OPTED TO WALK TO 
THE LOBBY FOR DISCHARGE. ENDORSED ACCORDINGLY.

## 2023-04-20 NOTE — NUR
RN TELE CLOSING NOTES





PATIENT IN BED  AWAKE   WITH  , . A/O X3, SOME CONFUSION AND FORGETFULNESS  NOTED. ON RA 
WITH NO S/S OF SOB OR CARDIAC DISTRESS. NO COMPLAINTS OF PAIN AT THIS TIME. . IV ACCES RAC  
#20   G   SL  , PATENT, FLUSHING WELL, FOR   NS @ 75 ML/HR. BUT PATIENT   REFUSED  , 
AMBULATORY  WITH  BRP   SAFETY PRECAUTIONS IN PLACE: BED LOCKED AND IN LOWEST POSITION, SIDE 
RAILS UP X3, CALL LIGHT AND TRAY TABLE WITHIN REACH.PM CARE RENDERED AND ALL NEEDS ATTENDED/ 
ALL DUE MEDICATIONS GIVEN, ALL NEEDS ATTENDED PM CARE RENDERED.  FOR  D/C PLANNING  AND  
WILL ENDORSED  TO  NEXT  SHIFT  .

## 2023-04-20 NOTE — NUR
MS RN NOTE



PATIENT SEEN BY DR. DURAN WITH ORDER TO DISCHARGE PATIENT. HEALTH TEACHING DONE REGARDING 
DISCHARGE PROCEDURE AND DISCHARGE INSTRUCTIONS. VERBALIZED UNDERSTANDING AND APPRECIATION. 
PATIENT WITH SOME DEMENTIA. ABLE TO ENGAGE BUT SOME COGNITIVE LIMITATIONS. WILL NEED 
REINFORCEMENTS. WILL CONTINUE WITH PLAN OF CARE. IN STABLE CONDITION. AWAITING ADVICE FROM 
 FOR PICK-UP.